# Patient Record
Sex: MALE | Race: WHITE | NOT HISPANIC OR LATINO | Employment: OTHER | URBAN - METROPOLITAN AREA
[De-identification: names, ages, dates, MRNs, and addresses within clinical notes are randomized per-mention and may not be internally consistent; named-entity substitution may affect disease eponyms.]

---

## 2024-11-04 ENCOUNTER — TELEPHONE (OUTPATIENT)
Dept: NEUROSURGERY | Facility: CLINIC | Age: 69
End: 2024-11-04
Payer: MEDICARE

## 2024-11-04 NOTE — PROGRESS NOTES
"Subjective   History of Present Illness: Rocco Trammell is a 69 y.o. male is being seen for consultation today at the request of Shayne Escobar MD Sciatica, unspecified laterality, Lumbosacral disc disease - MRI LUMBER.  He reports that I did a lumbar decompressive surgery on him in 2007 with great success.  The patient resumed his work and recreational activities including golf since that surgery in 2007.  He is done fairly well with his back until about 5 weeks ago when he developed a sudden onset of fairly severe pain in both lower extremities with a fairly constant sensation of pain in the right thigh and a more severe intermittent sensation in the left leg.  He has been to an acupuncturist for 3 visits and he says that is relieved the pain tremendously such that he no longer has pain but still has a weak sensation in the left leg.  He denies any new numbness.  Thinks he may have aggravated this by getting in and out of a golf cart playing 18 holes of golf 5 weeks ago.      History of Present Illness    Tobacco Use: Medium Risk (6/3/2024)    Received from [facility]    Patient History     Smoking Tobacco Use: Never     Smokeless Tobacco Use: Former     Passive Exposure: Never        The following portions of the patient's history were reviewed and updated as appropriate: allergies, current medications, past family history, past medical history, past social history, past surgical history and problem list.    Review of Systems   Constitutional:  Negative for fever.   Musculoskeletal:  Positive for back pain and gait problem.   Neurological:  Positive for weakness (left leg). Negative for numbness.   All other systems reviewed and are negative.      Objective     Vitals:    11/12/24 1153   BP: 142/78   BP Location: Left arm   Patient Position: Sitting   Cuff Size: Adult   Resp: 20   Weight: 95.3 kg (210 lb)   Height: 182.9 cm (72\")   PainSc:   2   PainLoc: Back     Body mass index is 28.48 " kg/m².      Physical Exam  Neurological Exam    Physical Exam:    CONSTITUTIONAL: This 69 year old  male appears well developed, well-nourished and in no acute distress.    HEAD & FACE: the head and face are symmetric, normocephalic and atraumatic.    EYES: Inspection of the conjunctivae and lids reveals no swelling, erythema or discharge.  Pupils are round, equal and reactive to light and there is no scleral icterus.    EARS, NOSE, MOUTH & THROAT: On inspection, the ears and nose are within normal limits.    NECK: the neck is supple and symmetric. The trachea is midline with no masses.      PULMONARY: Respiratory effort is normal with no increased work of breathing or signs of respiratory distress.    CARDIOVASCULAR:  Examination of the extremities shows no edema or varicosities.    MUSCULOSKELETAL: Gait and station are within normal limits without any limp today and an upright posture. The spine has no tenderness in the midline.    SKIN: The skin is warm, dry and intact.  Lower lumbar incision long since healed    NEUROLOGIC:   Cranial Nerves 2-12 intact  Normal motor strength noted although subjectively he feels weak in the left leg. Muscle bulk and tone are normal.  Sensory exam is normal to fine touch to confrontational testing bilaterally  Reflexes on the right side demonstrates 1/4 Knee Jerk Reflex, 0/4 Ankle Jerk Reflex and no ankle clonus on the right.   Reflexes on the left side demonstrates 1/4 Knee Jerk Reflex, 0/4 Ankle Jerk Reflex and no ankle clonus on the left.  Superficial/Primitive Reflexes: primitive reflexes were absent.  Yang's, Babinski, and Clonus signs all negative.  No coordination deficit observed.  Radicular testing showed a negative Danielito (JJ) test and negative straight leg raise.  Cortical function is intact and without deficits. Speech is normal.    PSYCHIATRIC: oriented to person, place and time but with repeat questioning he sometimes has to think about the answer and  he would be talking and sometimes forget the point he was trying to make. Patient's mood and affect are normal.    Assessment & Plan   Independent Review of Radiographic Studies:      I personally reviewed the images from the following studies.    RADIOLOGY IMAGING: XR SPINE LUMBAR AP AND LAT W FLEX AND EXT: completed on Date: 11/12/24 at [facility] Neurosurgery Office:  INDICATION: Rule out instability.  Findings: Patient has evidence of laminectomy L3-L5 with retrolisthesis of L2 on L3, anterolisthesis of L3 on L4, and slight retrolisthesis of L4 and L5 which do not change configuration with flexion and extension.  There is a tremendous amount of anterior osteophytosis at all lumbar levels that appear to have essentially fused across the anterior disc spaces.  Comparison: None other than MRI of the lumbar spine done as described below    MRI of the lumbar spine done on November 4, 2024 at [facility] reveals at L2-L3 lumbar spondylosis and a small left subarticular disc extrusion resulting in moderate bilateral neuroforaminal narrowing and  spinal stenosis.  Minimal anterolisthesis of L3 on L4 secondary to facet disease  resulting in moderate to severe right greater than left neuroforaminal narrowing and moderate spinal stenosis.  4 mm anterolisthesis of L5 on S1 secondary to facet disease  resulting in mild-to-moderate bilateral neuroforaminal narrowing             Medical Decision Making:      Patient has lumbar stenosis at 2 levels above his previous lumbar decompression.  He has a lot of facet arthropathy and degenerative disc changes throughout the lumbar spine that can contribute to a number of different scenarios of pain.  He is likely most symptomatic from the L2-3 and the L3-4 spinal stenosis which appears to be above his previous decompression.  I suspect there may have been at least a partial decompression at L3-L4 but due to scar tissue and facet arthropathy the stenosis appears to have  recurred at L3-L4 worse than L4-5 and L5-S1.  Since he is improved and I cannot detect any specific radiculopathy and certainly no myelopathy on my exam at this time he is a good candidate to try conservative treatment such as physical therapy and epidural steroid injections.  He was recently placed on Eliquis for his heart and he is going to ask his cardiologist if he can come off Eliquis for the purposes of interventional pain management treatments.  I will see him back about 6 weeks after he completes physical therapy and has a interventional pain management evaluation.  If he has persistent and recurrent episodes of lower extremity pains he may be a candidate for revision of his decompression and extension of decompression up to the L2-3 level across L2-3 and L3-L4.  I evaluated his spondylolisthesis with flexion-extension films and there is no evidence of any instability in fact he has enough spondylosis that I suspect he has anterior fusion across most of his lumbar levels.    Return in about 6 weeks (around 12/24/2024) for discussion of Physical Therapy results, discussion of results of pain management.    Diagnoses and all orders for this visit:    1. Spinal stenosis of lumbar region with radiculopathy (Primary)  -     Ambulatory Referral to Physical Therapy for Evaluation & Treatment  -     Ambulatory Referral to Pain Management Clinic    2. History of lumbar surgery  -     Ambulatory Referral to Physical Therapy for Evaluation & Treatment  -     Ambulatory Referral to Pain Management Clinic    3. Spondylolisthesis of lumbar region  -     Ambulatory Referral to Physical Therapy for Evaluation & Treatment  -     Ambulatory Referral to Pain Management Clinic             Anam Mobley MD [facility]  Neurological Surgery

## 2024-11-04 NOTE — TELEPHONE ENCOUNTER
11/04/24 Pt office visit with Dr Mobley was rescheduled from 11/05/24 to 11/12/24 [place]. Pt did not have specifics. Pt informed he is having his MRI completed on 11/04/24 at 1:00 p.m.  His family doctor, Dr Escobar, order this MRI.  I informed to bring the MRI disc to his office visit.

## 2024-11-05 NOTE — TELEPHONE ENCOUNTER
PT CALLED: STATES [facility] WILL NOT GIVE HIM HE DISC AND STATES THEY CAN POWERSHARE THE IMAGING. PLEASE ADVISE! THANKS!    PT CONTACT: [phone]  BEST TIME TO CALL: ANYTIME

## 2024-11-11 DIAGNOSIS — M43.16 SPONDYLOLISTHESIS OF LUMBAR REGION: Primary | ICD-10-CM

## 2024-11-12 ENCOUNTER — OFFICE VISIT (OUTPATIENT)
Dept: NEUROSURGERY | Facility: CLINIC | Age: 69
End: 2024-11-12
Payer: MEDICARE

## 2024-11-12 VITALS
RESPIRATION RATE: 20 BRPM | SYSTOLIC BLOOD PRESSURE: 142 MMHG | DIASTOLIC BLOOD PRESSURE: 78 MMHG | HEIGHT: 72 IN | WEIGHT: 210 LBS | BODY MASS INDEX: 28.44 KG/M2

## 2024-11-12 DIAGNOSIS — Z98.890 HISTORY OF LUMBAR SURGERY: ICD-10-CM

## 2024-11-12 DIAGNOSIS — M48.061 SPINAL STENOSIS OF LUMBAR REGION WITH RADICULOPATHY: Primary | ICD-10-CM

## 2024-11-12 DIAGNOSIS — M54.16 SPINAL STENOSIS OF LUMBAR REGION WITH RADICULOPATHY: Primary | ICD-10-CM

## 2024-11-12 DIAGNOSIS — M43.16 SPONDYLOLISTHESIS OF LUMBAR REGION: ICD-10-CM

## 2024-11-12 PROCEDURE — 99204 OFFICE O/P NEW MOD 45 MIN: CPT | Performed by: NEUROLOGICAL SURGERY

## 2024-11-12 PROCEDURE — 1160F RVW MEDS BY RX/DR IN RCRD: CPT | Performed by: NEUROLOGICAL SURGERY

## 2024-11-12 PROCEDURE — 1159F MED LIST DOCD IN RCRD: CPT | Performed by: NEUROLOGICAL SURGERY

## 2024-11-12 RX ORDER — METOPROLOL SUCCINATE 25 MG/1
25 TABLET, EXTENDED RELEASE ORAL DAILY
COMMUNITY
Start: 2024-07-22

## 2024-11-12 RX ORDER — APIXABAN 5 MG/1
5 TABLET, FILM COATED ORAL EVERY 12 HOURS SCHEDULED
COMMUNITY
Start: 2024-10-14

## 2024-11-12 RX ORDER — FENOFIBRATE 160 MG/1
160 TABLET ORAL DAILY
COMMUNITY
Start: 2024-10-15

## 2024-11-12 RX ORDER — ESCITALOPRAM OXALATE 20 MG/1
20 TABLET ORAL DAILY
COMMUNITY
Start: 2024-08-14 | End: 2025-08-14

## 2024-11-12 RX ORDER — FLUTICASONE PROPIONATE 50 MCG
1 SPRAY, SUSPENSION (ML) NASAL DAILY
COMMUNITY
Start: 2024-10-15

## 2024-11-12 RX ORDER — SILDENAFIL 100 MG/1
50-100 TABLET, FILM COATED ORAL AS NEEDED
COMMUNITY
Start: 2024-08-14 | End: 2025-08-14

## 2024-11-12 RX ORDER — AMLODIPINE BESYLATE 5 MG/1
5 TABLET ORAL DAILY
COMMUNITY
Start: 2024-10-15

## 2024-11-14 ENCOUNTER — TELEPHONE (OUTPATIENT)
Dept: NEUROSURGERY | Facility: CLINIC | Age: 69
End: 2024-11-14

## 2024-11-14 NOTE — TELEPHONE ENCOUNTER
Caller: SARY DIETZ    Relationship: SELF    Best call back number: [phone]    What orders are you requesting (i.e. lab or imaging): PT ORDERS        Additional notes: PT CALLED [facility] ON [address]-THEY STATE THEY HAVEN'T RECEIVED ANYTHING.  THEY CAN'T GET HIM IN UNTIL NEXT FRIDAY FOR A CONSULTATION.      HE IS WONDERING IF THE [facility] CAN GET HIM IN ANY SOONER.   PLEASE CALL PATIENT AND ADVISE.         The patient is a 49y Male complaining of psychiatric evaluation.

## 2024-11-15 ENCOUNTER — TELEPHONE (OUTPATIENT)
Dept: NEUROSURGERY | Facility: CLINIC | Age: 69
End: 2024-11-15
Payer: MEDICARE

## 2024-11-15 NOTE — TELEPHONE ENCOUNTER
11/15/24 pt called said Maricel at [facility] did not received referral.  I resubmitted, per pt request.  Pt also requested to send referral to [facility] elenita [facility].  He wants to see who he can get in the soonest.  I submitted to this location, per pt request.   Pt aware he may get two phone calls. One from [facility] neri and another from [facility].